# Patient Record
Sex: FEMALE | Race: WHITE | Employment: FULL TIME | URBAN - METROPOLITAN AREA
[De-identification: names, ages, dates, MRNs, and addresses within clinical notes are randomized per-mention and may not be internally consistent; named-entity substitution may affect disease eponyms.]

---

## 2021-03-30 NOTE — TELEPHONE ENCOUNTER
Results were discussed with patient and recommendations were made and she verbalized understanding.   Patient has an upcoming appt in June ans was advised to get labs done prior to her visit.       ----- Message from Pratik Cortes MD sent at 3/30/2021

## 2021-03-30 NOTE — PROGRESS NOTES
[facility]  New Patient History and Physical      HPI:   Patient presents with:  Physical  Heel Pain: R heel  Back Pain      Nelia Marc is a 40year old female presenting for:  Establishment of care.    Has  has no past medical hi CHOLESTEROL 67 > OR = 50 mg/dL    TRIGLYCERIDES 173 (H) <150 mg/dL    LDL-CHOLESTEROL 115 (H) mg/dL (calc)    CHOL/HDLC RATIO 3.1 <5.0 (calc)    NON-HDL CHOLESTEROL 144 (H) <130 mg/dL (calc)             Labs:   Complete Metabolic Panel:  Lab Results   Comp Constitutional: Negative for chills, fatigue, fever and unexpected weight change. HENT: Negative for congestion, ear pain, hearing loss, rhinorrhea, sinus pain and sore throat. Eyes: Negative for pain, redness and visual disturbance.    Respiratory: Pupils: Pupils are equal, round, and reactive to light. Neck:      Thyroid: No thyromegaly. Cardiovascular:      Rate and Rhythm: Normal rate and regular rhythm. Heart sounds: Normal heart sounds, S1 normal and S2 normal. No murmur heard.    No f 03/10/2022  Pap Smear,5 Years due on 03/01/2023  Pneumococcal Vaccine: Birth to [address] for this Visit:  Requested Prescriptions      No prescriptions requested or ordered in this encounter       Orders Placed This Encounter

## 2021-07-17 NOTE — PROGRESS NOTES
[address]  Return Patient      HPI:   No chief complaint on file. Jaydon Azevedo is a 40year old female presenting for:  Establishment of care. Has  has no past medical history on file.      Here for follow up on HLD and repe RATIO 3.1 <5.0 (calc)    NON-HDL CHOLESTEROL 144 (H) <130 mg/dL (calc)   HEMOGLOBIN A1C   Result Value Ref Range    HgbA1C 5.9 (H) <5.7 %    Estimated Average Glucose 123 68 - 126 mg/dL   LIPID PANEL   Result Value Ref Range    Cholesterol, Total 193 <200 Alcohol use: Yes    Drug use: Never       Family History:  Family History   Problem Relation Age of Onset   • No Known Problems Sister    • No Known Problems Brother           REVIEW OF SYSTEMS:   Review of Systems   Constitutional: Negative for chills, fa distress. Appearance: She is well-developed. HENT:      Head: Normocephalic and atraumatic. Eyes:      Conjunctiva/sclera: Conjunctivae normal.      Pupils: Pupils are equal, round, and reactive to light. Neck:      Thyroid: No thyromegaly.    Car Vaccine: Birth to [address] for this Visit:  Requested Prescriptions      No prescriptions requested or ordered in this encounter       No orders of the defined types were placed in this encounter.       Imaging & Consults:  Non

## 2021-09-15 NOTE — TELEPHONE ENCOUNTER
Results were discussed and she verbalized understanding.    ----- Message from Miguel Stanton MD sent at 9/13/2021 11:06 AM CDT -----  Please ensure she knows of mammogram results. No sign abnormality. One year screening repeat recommended.

## 2022-01-18 NOTE — PROGRESS NOTES
[facility]  Return Patient      HPI:   Patient presents with:  Abdominal Pain: left side      Braxton Cornelius is a 40year old female presenting for:  Establishment of care. Has  has no past medical history on file.      Here for fol 01/17/2022 04:39 PM    CREATSERUM 0.61 01/17/2022 04:39 PM    CA 8.8 01/17/2022 04:39 PM    GLU 91 01/17/2022 04:39 PM    TP 7.4 01/17/2022 04:39 PM    ALB 4.2 01/17/2022 04:39 PM    ALKPHO 96 01/17/2022 04:39 PM    AST 19 01/17/2022 04:39 PM    ALT 24 01/ Gastrointestinal: Positive for abdominal pain. Negative for abdominal distention, blood in stool, constipation and nausea. Endocrine: Negative for cold intolerance, heat intolerance and polyuria.    Genitourinary: Negative for dysuria, hematuria and urg Pulmonary effort is normal. No respiratory distress. Breath sounds: Normal breath sounds. No wheezing or rales. Chest:      Chest wall: No tenderness. Abdominal:      General: Bowel sounds are normal. There is no distension.       Palpations: Abdom HGB A1C (Glycohemoglobin) [496] [Q]      LIPASE [606] [Q]      Flulaval 6 months and older 0.5 ml PFS [07972]      Imaging & Consults:  FLULAVAL INFLUENZA VACCINE QUAD PRESERVATIVE FREE 0.5 ML  CT ABDOMEN+PELVIS([id number])        MD Mandeep Sánchez

## 2023-03-30 ENCOUNTER — APPOINTMENT (OUTPATIENT)
Dept: GENERAL RADIOLOGY | Age: 48
End: 2023-03-30
Attending: PHYSICIAN ASSISTANT
Payer: COMMERCIAL

## 2023-03-30 ENCOUNTER — HOSPITAL ENCOUNTER (OUTPATIENT)
Age: 48
Discharge: HOME | End: 2023-03-30
Payer: COMMERCIAL

## 2023-03-30 ENCOUNTER — TELEPHONE (OUTPATIENT)
Dept: INTERNAL MEDICINE CLINIC | Facility: CLINIC | Age: 48
End: 2023-03-30

## 2023-03-30 VITALS
OXYGEN SATURATION: 99 % | RESPIRATION RATE: 18 BRPM | HEART RATE: 84 BPM | DIASTOLIC BLOOD PRESSURE: 58 MMHG | TEMPERATURE: 98 F | SYSTOLIC BLOOD PRESSURE: 132 MMHG

## 2023-03-30 DIAGNOSIS — J06.9 VIRAL URI WITH COUGH: ICD-10-CM

## 2023-03-30 DIAGNOSIS — R06.2 WHEEZING: ICD-10-CM

## 2023-03-30 DIAGNOSIS — Z20.822 ENCOUNTER FOR LABORATORY TESTING FOR COVID-19 VIRUS: Primary | ICD-10-CM

## 2023-03-30 LAB
S PYO AG THROAT QL: NEGATIVE
SARS-COV-2 RNA RESP QL NAA+PROBE: NOT DETECTED

## 2023-03-30 PROCEDURE — 99203 OFFICE O/P NEW LOW 30 MIN: CPT | Performed by: PHYSICIAN ASSISTANT

## 2023-03-30 PROCEDURE — U0002 COVID-19 LAB TEST NON-CDC: HCPCS | Performed by: PHYSICIAN ASSISTANT

## 2023-03-30 PROCEDURE — 87880 STREP A ASSAY W/OPTIC: CPT | Performed by: PHYSICIAN ASSISTANT

## 2023-03-30 PROCEDURE — 71046 X-RAY EXAM CHEST 2 VIEWS: CPT | Performed by: PHYSICIAN ASSISTANT

## 2023-03-30 RX ORDER — BENZONATATE 100 MG/1
100 CAPSULE ORAL 3 TIMES DAILY PRN
Qty: 21 CAPSULE | Refills: 0 | Status: SHIPPED | OUTPATIENT
Start: 2023-03-30 | End: 2023-04-06

## 2023-03-30 RX ORDER — PREDNISONE 20 MG/1
40 TABLET ORAL DAILY
Qty: 10 TABLET | Refills: 0 | Status: SHIPPED | OUTPATIENT
Start: 2023-03-30 | End: 2023-04-04

## 2023-03-30 RX ORDER — ALBUTEROL SULFATE 90 UG/1
2 AEROSOL, METERED RESPIRATORY (INHALATION) EVERY 4 HOURS PRN
Qty: 1 EACH | Refills: 0 | Status: SHIPPED | OUTPATIENT
Start: 2023-03-30 | End: 2023-04-29

## 2023-03-30 NOTE — TELEPHONE ENCOUNTER
Patient is complaining of severe cough with yellow phlegm, sore throat, headaches, sob. Patient took a covid test last night and it was negative. Patient is requesting for an appt with MD. Please call and advise.        This started this Monday

## 2023-03-30 NOTE — ED INITIAL ASSESSMENT (HPI)
Pt in [address] [place] [address] for c/o sore throat, cough and hoarse voice x 4 days. No fever.

## 2023-12-11 ENCOUNTER — TELEPHONE (OUTPATIENT)
Dept: INTERNAL MEDICINE CLINIC | Facility: CLINIC | Age: 48
End: 2023-12-11

## 2023-12-11 ENCOUNTER — APPOINTMENT (OUTPATIENT)
Dept: GENERAL RADIOLOGY | Age: 48
End: 2023-12-11
Attending: NURSE PRACTITIONER
Payer: COMMERCIAL

## 2023-12-11 ENCOUNTER — HOSPITAL ENCOUNTER (OUTPATIENT)
Age: 48
Discharge: HOME | End: 2023-12-11
Payer: COMMERCIAL

## 2023-12-11 VITALS
OXYGEN SATURATION: 98 % | RESPIRATION RATE: 18 BRPM | DIASTOLIC BLOOD PRESSURE: 67 MMHG | SYSTOLIC BLOOD PRESSURE: 112 MMHG | HEART RATE: 81 BPM | TEMPERATURE: 98 F

## 2023-12-11 DIAGNOSIS — J11.1 INFLUENZA: Primary | ICD-10-CM

## 2023-12-11 LAB
POCT INFLUENZA A: POSITIVE
POCT INFLUENZA B: NEGATIVE
S PYO AG THROAT QL: NEGATIVE
SARS-COV-2 RNA RESP QL NAA+PROBE: NOT DETECTED

## 2023-12-11 PROCEDURE — 71046 X-RAY EXAM CHEST 2 VIEWS: CPT | Performed by: NURSE PRACTITIONER

## 2023-12-11 PROCEDURE — 99214 OFFICE O/P EST MOD 30 MIN: CPT | Performed by: NURSE PRACTITIONER

## 2023-12-11 PROCEDURE — U0002 COVID-19 LAB TEST NON-CDC: HCPCS | Performed by: NURSE PRACTITIONER

## 2023-12-11 PROCEDURE — 87880 STREP A ASSAY W/OPTIC: CPT | Performed by: NURSE PRACTITIONER

## 2023-12-11 PROCEDURE — 87502 INFLUENZA DNA AMP PROBE: CPT | Performed by: NURSE PRACTITIONER

## 2023-12-11 PROCEDURE — 94640 AIRWAY INHALATION TREATMENT: CPT | Performed by: NURSE PRACTITIONER

## 2023-12-11 RX ORDER — IPRATROPIUM BROMIDE AND ALBUTEROL SULFATE 2.5; .5 MG/3ML; MG/3ML
3 SOLUTION RESPIRATORY (INHALATION) ONCE
Status: COMPLETED | OUTPATIENT
Start: 2023-12-11 | End: 2023-12-11

## 2023-12-11 RX ORDER — ALBUTEROL SULFATE 90 UG/1
2 AEROSOL, METERED RESPIRATORY (INHALATION) EVERY 4 HOURS PRN
Qty: 1 EACH | Refills: 0 | Status: SHIPPED | OUTPATIENT
Start: 2023-12-11 | End: 2024-01-10

## 2023-12-11 RX ORDER — BENZONATATE 100 MG/1
200 CAPSULE ORAL 3 TIMES DAILY PRN
Qty: 30 CAPSULE | Refills: 0 | Status: SHIPPED | OUTPATIENT
Start: 2023-12-11 | End: 2024-01-10

## 2023-12-11 NOTE — TELEPHONE ENCOUNTER
Returned call to patient  w/ viral symptoms via  (# [phone]). Per patient she has experienced a scratchy sore throat and a cough x 3 days and asks for appt to see Dr Swapnil Howard in the office. Explained  (via ) that without a negative covid test result we cannot bring her into the office for exam and even if we could there are currently no open appt slots until next week. Advised she be seen in urgent/ Immediate Care clinic for evaluation- covid testing/ possibly strep testing.   [facility] for eval.

## 2023-12-11 NOTE — TELEPHONE ENCOUNTER
Patient has been with a cough for 3 days and has a sore throat. No covid test done. She wants to know if she can be seen.

## 2023-12-12 NOTE — DISCHARGE INSTRUCTIONS
Push fluids. Rest.  Tylenol or ibuprofen as needed for pain or fever. Take the medications as prescribed. Follow-up with your doctor. Return for any concerns.

## 2025-02-07 PROBLEM — N84.1 ENDOCERVICAL POLYP: Status: ACTIVE | Noted: 2018-02-22

## 2025-04-01 ENCOUNTER — OFFICE VISIT (OUTPATIENT)
Age: 50
End: 2025-04-01
Payer: COMMERCIAL

## 2025-04-01 VITALS
OXYGEN SATURATION: 97 % | HEIGHT: 62 IN | TEMPERATURE: 97 F | HEART RATE: 102 BPM | WEIGHT: 284 LBS | BODY MASS INDEX: 52.26 KG/M2

## 2025-04-01 DIAGNOSIS — Z12.11 ENCOUNTER FOR SCREENING COLONOSCOPY: ICD-10-CM

## 2025-04-01 DIAGNOSIS — E66.01 MORBID OBESITY (HCC): ICD-10-CM

## 2025-04-01 DIAGNOSIS — N92.6 MISSED PERIOD: ICD-10-CM

## 2025-04-01 DIAGNOSIS — E55.9 VITAMIN D DEFICIENCY: ICD-10-CM

## 2025-04-01 DIAGNOSIS — Z00.00 PREVENTATIVE HEALTH CARE: ICD-10-CM

## 2025-04-01 DIAGNOSIS — E61.1 IRON DEFICIENCY: ICD-10-CM

## 2025-04-01 DIAGNOSIS — Z12.4 PAP SMEAR FOR CERVICAL CANCER SCREENING: ICD-10-CM

## 2025-04-01 DIAGNOSIS — K62.5 BRIGHT RED BLOOD PER RECTUM: Primary | ICD-10-CM

## 2025-04-01 DIAGNOSIS — Z12.31 ENCOUNTER FOR SCREENING MAMMOGRAM FOR MALIGNANT NEOPLASM OF BREAST: ICD-10-CM

## 2025-04-01 PROCEDURE — 99215 OFFICE O/P EST HI 40 MIN: CPT | Performed by: INTERNAL MEDICINE

## 2025-04-01 NOTE — PROGRESS NOTES
[facility] Patient History and Physical      HPI:     Chief Complaint   Patient presents with    Physical       Francia JATINDER Miguel Curran is a 50 year old female presenting for:  [facility] care.  Has  has no past medical history on file.    Pt has not been seen since 2022.      50-year-old woman with a history of morbid obesity presents with multiple concerns. She reports weight gain of approximately 30 to 40 pounds over the past year. Her diet is high in carbohydrates. She also complains of fatigue and irregular menstruation, with her last menstrual period occurring about 6 months ago.    The patient reports bright red blood per rectum. No severe abdominal pain is reported.    She has a history of iron deficiency and vitamin D deficiency.    Medical History  - Morbid obesity  - Iron deficiency   - Vitamin D deficiency    Social History  - Diet: high in carbohydrates    Review of Systems  - Constitutional: Fatigue reported  - Gastrointestinal: No severe abdominal pain reported  - Genitourinary: Irregular menstruation, last menstrual period about 6 months ago  - Hematologic: Bright red blood per rectum          Labs:   Complete Metabolic Panel:  Lab Results   Component Value Date/Time     01/17/2022 04:39 PM    K 4.4 01/17/2022 04:39 PM     01/17/2022 04:39 PM    CO2 24 01/17/2022 04:39 PM    CREATSERUM 0.61 01/17/2022 04:39 PM    CA 8.8 01/17/2022 04:39 PM    GLU 91 01/17/2022 04:39 PM    TP 7.4 01/17/2022 04:39 PM    ALB 4.2 01/17/2022 04:39 PM    ALKPHO 96 01/17/2022 04:39 PM    AST 19 01/17/2022 04:39 PM    ALT 24 01/17/2022 04:39 PM    BILT 0.3 01/17/2022 04:39 PM        CBC:  Lab Results   Component Value Date    WBC 8.6 01/17/2022    HGB 11.9 01/17/2022    HCT 36.0 01/17/2022     01/17/2022            Hemoglobin A1C, Microalbumin  Lab Results   Component Value Date/Time    A1C 5.8 (H) 01/17/2022 04:39 PM        Lipid panel  Lab Results   Component  Value Date/Time    CHOLEST 193 07/15/2021 09:44 AM    HDL 66 (H) 07/15/2021 09:44 AM    TRIG 143 07/15/2021 09:44 AM     (H) 07/15/2021 09:44 AM    NONHDLC 127 07/15/2021 09:44 AM     The ASCVD Risk score (Felicia AGUILAR, et al., 2019) failed to calculate for the following reasons:    The systolic blood pressure is missing    Cannot find a previous HDL lab    Cannot find a previous total cholesterol lab       Medications:  No current outpatient medications on file.      PMH:  No past medical history on file.      PSH:  No past surgical history on file.    Allergies:  Allergies[1]   Social History:  Social History     Socioeconomic History    Marital status:    Tobacco Use    Smoking status: Never    Smokeless tobacco: Never   Substance and Sexual Activity    Alcohol use: Yes    Drug use: Never     Social Drivers of Health     Food Insecurity: No Food Insecurity (6/30/2022)    [facility]    Food Insecurity     Within the past 30 days, I worried whether my food would run out before I got money to buy more. / En los últimos 30 días, me preocupó que la comida se podía acabar antes de tener dinero para compr...: Never true / Nunca     Within the past 30 days, the food that I bought just didn't last, and I didn't have money to get more. / En los últimos 30 días, La comida que compré no rindió lo suficiente, y no tenía dinero para...: Never true / Nunca        Family History:  Family History   Problem Relation Age of Onset    No Known Problems Sister     No Known Problems Brother           REVIEW OF SYSTEMS:   Review of Systems   Constitutional:  Negative for chills, fatigue, fever and unexpected weight change.   HENT:  Negative for congestion, ear pain, hearing loss, rhinorrhea, sinus pain and sore throat.    Eyes:  Negative for pain, redness and visual disturbance.   Respiratory:  Negative for apnea, cough, chest tightness, shortness of breath  and wheezing.    Cardiovascular:  Negative for chest pain, palpitations and leg swelling.   Gastrointestinal:  Negative for abdominal distention, abdominal pain, blood in stool, constipation and nausea.   Endocrine: Negative for cold intolerance, heat intolerance and polyuria.   Genitourinary:  Negative for dysuria, hematuria and urgency.   Musculoskeletal:  Negative for arthralgias, back pain, gait problem, joint swelling, myalgias and neck pain.   Skin:  Negative for rash and wound.   Allergic/Immunologic: Negative for food allergies and immunocompromised state.   Neurological:  Negative for dizziness, seizures, facial asymmetry, speech difficulty, weakness, light-headedness, numbness and headaches.   Hematological:  Negative for adenopathy. Does not bruise/bleed easily.   Psychiatric/Behavioral:  Negative for behavioral problems, sleep disturbance and suicidal ideas. The patient is not nervous/anxious.             PHYSICAL EXAM:   Pulse 102   Temp 97 °F (36.1 °C)   Ht 5' 2\" (1.575 m)   Wt 284 lb (128.8 kg)   SpO2 97%   BMI 51.94 kg/m²  Estimated body mass index is 51.94 kg/m² as calculated from the following:    Height as of this encounter: 5' 2\" (1.575 m).    Weight as of this encounter: 284 lb (128.8 kg).     Wt Readings from Last 3 Encounters:   04/01/25 284 lb (128.8 kg)   04/05/22 269 lb (122 kg)   01/17/22 273 lb (123.8 kg)       Physical Exam  Vitals reviewed.   Constitutional:       General: She is not in acute distress.     Appearance: She is well-developed. She is obese.   HENT:      Head: Normocephalic and atraumatic.   Eyes:      Conjunctiva/sclera: Conjunctivae normal.      Pupils: Pupils are equal, round, and reactive to light.   Neck:      Thyroid: No thyromegaly.   Cardiovascular:      Rate and Rhythm: Normal rate and regular rhythm.      Heart sounds: Normal heart sounds, S1 normal and S2 normal. No murmur heard.     No friction rub. No gallop.   Pulmonary:      Effort: Pulmonary effort is  normal. No respiratory distress.      Breath sounds: Normal breath sounds. No wheezing or rales.   Chest:      Chest wall: No tenderness.   Abdominal:      General: Bowel sounds are normal. There is no distension.      Palpations: Abdomen is soft. There is no mass.      Tenderness: There is no abdominal tenderness. There is no guarding or rebound.   Musculoskeletal:         General: No tenderness. Normal range of motion.      Cervical back: Normal range of motion.   Lymphadenopathy:      Cervical: No cervical adenopathy.   Skin:     General: Skin is warm.      Findings: No erythema or rash.   Neurological:      Mental Status: She is alert and oriented to person, place, and time.      Cranial Nerves: No cranial nerve deficit.      Deep Tendon Reflexes: Reflexes are normal and symmetric.   Psychiatric:         Behavior: Behavior normal.         Thought Content: Thought content normal.         Judgment: Judgment normal.             ASSESSMENT AND PLAN:   Patient is a 50 year old female who presents primarily presents for:    Assessment and Plan:    1. Morbid Obesity with Recent Weight Gain:     - 50-year-old woman with history of morbid obesity     - Significant weight gain of approximately 30-40 pounds over the past year     - Diet high in carbohydrates     - May be contributing to fatigue and menstrual irregularities     - Provide weight loss counseling     - Refer to bariatric surgery for evaluation     - Recommend dietary modifications, focusing on reducing carbohydrate intake    2. Menstrual Irregularities:     - Last menstrual period approximately 6 months ago     - May be indicative of perimenopausal state     - Perform urine pregnancy test     - Consider further endocrine workup if pregnancy test is negative    3. Hematochezia:     - Reports bright red blood per rectum     - Refer to gastroenterology for evaluation and possible colonoscopy    4. Iron Deficiency:     - History of iron deficiency     - Order iron  studies    5. Vitamin D Deficiency:     - History of vitamin D deficiency     - Check vitamin D levels    6. Cervical Cancer Screening:     - Due for cervical cancer screening     - Schedule Pap smear    Medications:  (No medications explicitly mentioned in the provided text)      Health Maintenance:  Health Maintenance   Topic Date Due    Annual Physical  Never done    Colorectal Cancer Screening  Never done    DTaP,Tdap,and Td Vaccines (1 - Tdap) Never done    Mammogram  09/08/2022    Pap Smear  03/01/2023    COVID-19 Vaccine (3 - 2024-25 season) 09/01/2024    Annual Depression Screening  Never done    Influenza Vaccine (Season Ended) 10/01/2025    Zoster Vaccines (1 of 2) 03/07/2026    Pneumococcal Vaccine: Birth to 50yrs  Aged Out    Meningococcal B Vaccine  Aged Out             Meds & Refills for this Visit:  Requested Prescriptions      No prescriptions requested or ordered in this encounter       No orders of the defined types were placed in this encounter.      Imaging & Consults:  None        Micky Blackburn MD     No follow-ups on file.  Important issues to follow up on next visit      Patient indicates understanding of the above recommendations and agrees to the above plan.  Reasurrance and education provided. All questions answered.  Notified to call with any questions, complications, allergies, or worsening or changing symptoms as well as any side effects or complications from the treatments .  Red flags/ ER precautions discussed.    This note was produced using voice recognition software.  As a result, errors may occur.  When identified, these errors have been corrected.  While every attempt is made to correct errors during dictation, errors may still exist.    Total time spent was 68 minutes which includes: Preparation to see patient including chart review, reviewing appropriate medical history, counseling and education (diet and exercise), discussing treatment options, ordering appropriate  diagnostic tests and documentation.    As part of our commitment to providing you with comprehensive, transparent, and timely access to your health information, we adhere to the guidelines set forth by the 21st Century Cures Act. This Act enhances your rights to access your electronic health information and ensures that you can easily obtain your medical records.  Please note that the verbage used in this note is intended for medical documentation and communication and may be interpreted as forthright.  Please do not hesitate to contact my office if you have any questions.        Micky Blackburn MD  Internal Medicine/Primary Care  [facility]                   [1] No Known Allergies

## 2025-04-07 ENCOUNTER — LAB ENCOUNTER (OUTPATIENT)
Dept: LAB | Facility: HOSPITAL | Age: 50
End: 2025-04-07
Attending: INTERNAL MEDICINE
Payer: COMMERCIAL

## 2025-04-07 ENCOUNTER — HOSPITAL ENCOUNTER (OUTPATIENT)
Dept: MAMMOGRAPHY | Facility: HOSPITAL | Age: 50
Discharge: HOME | End: 2025-04-07
Attending: INTERNAL MEDICINE
Payer: COMMERCIAL

## 2025-04-07 DIAGNOSIS — Z12.31 ENCOUNTER FOR SCREENING MAMMOGRAM FOR MALIGNANT NEOPLASM OF BREAST: ICD-10-CM

## 2025-04-07 PROCEDURE — 77063 BREAST TOMOSYNTHESIS BI: CPT | Performed by: INTERNAL MEDICINE

## 2025-04-07 PROCEDURE — 77067 SCR MAMMO BI INCL CAD: CPT | Performed by: INTERNAL MEDICINE

## 2025-04-13 ENCOUNTER — LAB ENCOUNTER (OUTPATIENT)
Dept: LAB | Facility: HOSPITAL | Age: 50
End: 2025-04-13
Attending: INTERNAL MEDICINE
Payer: COMMERCIAL

## 2025-04-13 DIAGNOSIS — E55.9 VITAMIN D DEFICIENCY: ICD-10-CM

## 2025-04-13 DIAGNOSIS — N92.6 IRREGULAR MENSTRUAL CYCLE: Primary | ICD-10-CM

## 2025-04-13 LAB
ALBUMIN SERPL-MCNC: 4.3 G/DL (ref 3.2–4.8)
ALBUMIN/GLOB SERPL: 1.7 {RATIO} (ref 1–2)
ALP LIVER SERPL-CCNC: 105 U/L (ref 39–100)
ALT SERPL-CCNC: 50 U/L (ref 10–49)
ANION GAP SERPL CALC-SCNC: 8 MMOL/L (ref 0–18)
AST SERPL-CCNC: 33 U/L (ref ?–34)
B-HCG UR QL: NEGATIVE
BASOPHILS # BLD AUTO: 0.04 X10(3) UL (ref 0–0.2)
BASOPHILS NFR BLD AUTO: 0.6 %
BILIRUB SERPL-MCNC: 0.6 MG/DL (ref 0.3–1.2)
BUN BLD-MCNC: 11 MG/DL (ref 9–23)
BUN/CREAT SERPL: 17.7 (ref 10–20)
CALCIUM BLD-MCNC: 8.8 MG/DL (ref 8.7–10.4)
CHLORIDE SERPL-SCNC: 109 MMOL/L (ref 98–112)
CHOLEST SERPL-MCNC: 193 MG/DL (ref ?–200)
CO2 SERPL-SCNC: 25 MMOL/L (ref 21–32)
CREAT BLD-MCNC: 0.62 MG/DL (ref 0.55–1.02)
DEPRECATED HBV CORE AB SER IA-ACNC: 56 NG/ML (ref 50–306)
DEPRECATED RDW RBC AUTO: 46.9 FL (ref 35.1–46.3)
EGFRCR SERPLBLD CKD-EPI 2021: 109 ML/MIN/1.73M2 (ref 60–?)
EOSINOPHIL # BLD AUTO: 0.33 X10(3) UL (ref 0–0.7)
EOSINOPHIL NFR BLD AUTO: 5.1 %
ERYTHROCYTE [DISTWIDTH] IN BLOOD BY AUTOMATED COUNT: 15.1 % (ref 11–15)
EST. AVERAGE GLUCOSE BLD GHB EST-MCNC: 131 MG/DL (ref 68–126)
FASTING PATIENT LIPID ANSWER: YES
FASTING STATUS PATIENT QL REPORTED: YES
GLOBULIN PLAS-MCNC: 2.5 G/DL (ref 2–3.5)
GLUCOSE BLD-MCNC: 93 MG/DL (ref 70–99)
HBA1C MFR BLD: 6.2 % (ref ?–5.7)
HCT VFR BLD AUTO: 38.4 % (ref 35–48)
HDLC SERPL-MCNC: 54 MG/DL (ref 40–59)
HGB BLD-MCNC: 12.1 G/DL (ref 12–16)
IMM GRANULOCYTES # BLD AUTO: 0.02 X10(3) UL (ref 0–1)
IMM GRANULOCYTES NFR BLD: 0.3 %
IRON SATN MFR SERPL: 18 % (ref 15–50)
IRON SERPL-MCNC: 59 UG/DL (ref 50–170)
LDLC SERPL CALC-MCNC: 113 MG/DL (ref ?–100)
LYMPHOCYTES # BLD AUTO: 1.97 X10(3) UL (ref 1–4)
LYMPHOCYTES NFR BLD AUTO: 30.2 %
MCH RBC QN AUTO: 26.9 PG (ref 26–34)
MCHC RBC AUTO-ENTMCNC: 31.5 G/DL (ref 31–37)
MCV RBC AUTO: 85.5 FL (ref 80–100)
MONOCYTES # BLD AUTO: 0.27 X10(3) UL (ref 0.1–1)
MONOCYTES NFR BLD AUTO: 4.1 %
NEUTROPHILS # BLD AUTO: 3.89 X10 (3) UL (ref 1.5–7.7)
NEUTROPHILS # BLD AUTO: 3.89 X10(3) UL (ref 1.5–7.7)
NEUTROPHILS NFR BLD AUTO: 59.7 %
NONHDLC SERPL-MCNC: 139 MG/DL (ref ?–130)
OSMOLALITY SERPL CALC.SUM OF ELEC: 293 MOSM/KG (ref 275–295)
PLATELET # BLD AUTO: 275 10(3)UL (ref 150–450)
POTASSIUM SERPL-SCNC: 4.3 MMOL/L (ref 3.5–5.1)
PROT SERPL-MCNC: 6.8 G/DL (ref 5.7–8.2)
RBC # BLD AUTO: 4.49 X10(6)UL (ref 3.8–5.3)
SODIUM SERPL-SCNC: 142 MMOL/L (ref 136–145)
T4 FREE SERPL-MCNC: 1.1 NG/DL (ref 0.8–1.7)
TOTAL IRON BINDING CAPACITY: 325 UG/DL (ref 250–425)
TRANSFERRIN SERPL-MCNC: 258 MG/DL (ref 250–380)
TRIGL SERPL-MCNC: 145 MG/DL (ref 30–149)
TSI SER-ACNC: 5.95 UIU/ML (ref 0.55–4.78)
VIT D+METAB SERPL-MCNC: 21.5 NG/ML (ref 30–100)
VLDLC SERPL CALC-MCNC: 25 MG/DL (ref 0–30)
WBC # BLD AUTO: 6.5 X10(3) UL (ref 4–11)

## 2025-04-13 PROCEDURE — 82728 ASSAY OF FERRITIN: CPT | Performed by: INTERNAL MEDICINE

## 2025-04-13 PROCEDURE — 84439 ASSAY OF FREE THYROXINE: CPT | Performed by: INTERNAL MEDICINE

## 2025-04-13 PROCEDURE — 80053 COMPREHEN METABOLIC PANEL: CPT | Performed by: INTERNAL MEDICINE

## 2025-04-13 PROCEDURE — 84466 ASSAY OF TRANSFERRIN: CPT | Performed by: INTERNAL MEDICINE

## 2025-04-13 PROCEDURE — 81025 URINE PREGNANCY TEST: CPT

## 2025-04-13 PROCEDURE — 83540 ASSAY OF IRON: CPT | Performed by: INTERNAL MEDICINE

## 2025-04-13 PROCEDURE — 82306 VITAMIN D 25 HYDROXY: CPT

## 2025-04-13 PROCEDURE — 36415 COLL VENOUS BLD VENIPUNCTURE: CPT | Performed by: INTERNAL MEDICINE

## 2025-04-13 PROCEDURE — 83036 HEMOGLOBIN GLYCOSYLATED A1C: CPT | Performed by: INTERNAL MEDICINE

## 2025-04-13 PROCEDURE — 80061 LIPID PANEL: CPT | Performed by: INTERNAL MEDICINE

## 2025-04-13 PROCEDURE — 85025 COMPLETE CBC W/AUTO DIFF WBC: CPT | Performed by: INTERNAL MEDICINE

## 2025-04-13 PROCEDURE — 84443 ASSAY THYROID STIM HORMONE: CPT | Performed by: INTERNAL MEDICINE

## 2025-04-24 ENCOUNTER — OFFICE VISIT (OUTPATIENT)
Facility: CLINIC | Age: 50
End: 2025-04-24
Payer: COMMERCIAL

## 2025-04-24 ENCOUNTER — TELEPHONE (OUTPATIENT)
Facility: CLINIC | Age: 50
End: 2025-04-24

## 2025-04-24 VITALS
WEIGHT: 282 LBS | HEART RATE: 101 BPM | BODY MASS INDEX: 51.89 KG/M2 | DIASTOLIC BLOOD PRESSURE: 72 MMHG | HEIGHT: 62 IN | SYSTOLIC BLOOD PRESSURE: 107 MMHG

## 2025-04-24 DIAGNOSIS — K62.5 BRBPR (BRIGHT RED BLOOD PER RECTUM): ICD-10-CM

## 2025-04-24 DIAGNOSIS — Z12.11 COLON CANCER SCREENING: Primary | ICD-10-CM

## 2025-04-24 PROCEDURE — 99204 OFFICE O/P NEW MOD 45 MIN: CPT

## 2025-04-24 NOTE — TELEPHONE ENCOUNTER
Scheduled for:  Colonoscopy [id number]   Location:  [facility] ([facility])  31+15=46  Provider: Zane Lang MD    Date:  7/14/2025 Monday  Time:   9:50 am  (Patient made aware will receive phone call the day before with an arrival time)    Sedation:  MAC  Prep:  Split GoLytely    Diagnosis with codes:    ICD-10-CM   1. Colon cancer screening  Z12.11   2. BRBPR (bright red blood per rectum)  K62.5        Meds/Allergies Reconciled?:   Provider Reviewed   Was patient informed to call insurance with codes (Y/N):  Yes  Referral sent?: Referral was sent at the time of electronic surgical scheduling.  [facility] or [facility] notified?: I sent an electronic request to ENDO Scheduling and received a confirmation today.     Medication Orders:  Patient is aware to NOT take iron pills, herbal meds and diet supplements for 7 days before exam. Also to NOT take any form of alcohol, recreational drugs and any forms of ED meds 24 hours before exam.     Misc Orders:  N/A   Further instructions given by staff:  I Provided prep instructions to patient and reviewed date, time and location. Patient verbalized that  She / Her understood and is aware to call with any questions.  Patient was informed about the new cancellation policy for She / Her procedure. Patient was also given copy of the cancellation policy at the time of the appointment and verbalized understanding.

## 2025-04-24 NOTE — H&P
[facility] - Gastroenterology                                                                                                  Clinic History and Physical     Chief Complaint   Patient presents with    Blood In Stool       Requesting physician or provider: Micky Blackburn MD    HPI:   Francia Curran is a 50 year old year-old female with active diagnoses including hypothyroidism, obesity, pre-diabetes. Prior medical/surgical hx in note table. The patient presents for colon cancer screening evaluation.    #BRBPR  -she reports episode lasting 3 weeks of blood on tissue when wiping after bowel movement. Last occurred 10 days ago. Did not try any treatments at home. Denies any rectal or anal pain.   -has bowel movements once daily, stool is soft  -ferritin, iron & hemoglobin WNL on 4/13 labs    #CRC screening  -no prior colonoscopy    Patient is here today as a referral from her PCP for evaluation prior to undergoing colonoscopy for CRC screening. Patient denies any GI symptoms of nausea, vomiting, heartburn, dyspepsia, dysphagia, hematemesis, abdominal pain, change in bowel habits, constipation, diarrhea, hematochezia, or melena.  Additionally there is no unintentional weight loss.    Pt is due for CRC screening. We discussed the available screening options for CRC such as FIT and cologuard. They are electing to pursue colonoscopy at this time.     Last colonoscopy: none   Last EGD:  none     NSAIDS: none   Tobacco: none   Alcohol:  Marijuana: none   Illicit drugs: none     FH GI malignancy:     No history of adverse reaction to sedation  No CANDIDO  No anticoagulants/antiplatelet  No pacemaker/defibrillator  No pain medications and/or sleep aides    Wt Readings from Last 6 Encounters:   04/24/25 282 lb (127.9 kg)   04/01/25 284 lb (128.8 kg)   04/05/22 269 lb (122 kg)   01/17/22 273 lb (123.8 kg)   07/15/21 260 lb (117.9 kg)   03/10/21 258 lb (117 kg)          History, Medications, Allergies, ROS:       Past Medical History[1]   Past Surgical History[2]   Family Hx: Family History[3]   Social History: Short Social Hx on File[4]     Medications (Active prior to today's visit):  Current Medications[5]    Allergies:  Allergies[6]    ROS:   CONSTITUTIONAL: negative for fevers, chills, sweats  EYES Negative for scleral icterus or redness, and diplopia  HEENT: Negative for hoarseness  RESPIRATORY: Negative for cough and severe shortness of breath  CARDIOVASCULAR: Negative for crushing sub-sternal chest pain  GASTROINTESTINAL: See HPI  GENITOURINARY: Negative for dysuria  MUSCULOSKELETAL: Negative for arthralgias and myalgias  SKIN: Negative for jaundice, rash or pruritus  NEUROLOGICAL: Negative for dizziness and headaches  BEHAVIOR/PSYCH: Negative for psychotic behavior      PHYSICAL EXAM:   Blood pressure 107/72, pulse 101, height 5' 2\" (1.575 m), weight 282 lb (127.9 kg), last menstrual period 10/01/2024.    GEN: Alert, no acute distress, well-nourished   HEENT: anicteric sclera, neck supple, trachea midline, MMM, no palpable or tender neck or supraclavicular lymph nodes  CV: RRR, the extremities are warm and well perfused   LUNGS: No increased work of breathing, CTAB  ABDOMEN: Soft, symmetrical, non-tender without distention or guarding. No scars or lesions. Aorta is without bruit or visible pulsation. Umbilicus is midline without herniation. Normoactive bowel sounds are present, No masses, hepatomegaly or splenomegaly noted. Exam limited by body habitus  MSK: No erythema, no warmth, no swelling of joints  SKIN: No jaundice, no erythema, no rashes, no lesions  HEMATOLOGIC: No bleeding, no bruising  NEURO: Alert and interactive, GRIFFIN  PSYCH: appropriate mood & affect    Labs/Imaging:     Patient's labs and imaging were reviewed and discussed with patient today.    .  ASSESSMENT/PLAN:   Francia Curran is a 50 year old year-old female with active diagnoses including hypothyroidism, obesity,  pre-diabetes. Prior medical/surgical hx in note table. The patient presents for colon cancer screening evaluation.    #BRBPR  -she reports episode lasting 3 weeks of blood on tissue when wiping after bowel movement. Last occurred 10 days ago. Did not try any treatments at home. Denies any rectal or anal pain.   -has bowel movements once daily, stool is soft  -ferritin, iron & hemoglobin WNL on 4/13 labs  -Symptom was not exacerbated by any stool changes.  There is no new pain.  No anemia.  Advised patient to monitor and follow-up ASAP if symptom returns.  Otherwise we will plan for screening colonoscopy    #CRC screening  -no prior colonoscopy  -no anemia noted on blood work.    Recommend:  -follow up sooner if bleeding returns     -------------------------------------------------------------------------------------------------  -Schedule colonoscopy with [facility]oscopist  Diagnosis: BRBPR, CRC screening   Sedation: MAC  Prep: split dose golytely    Medication changes:   Stop taking vitamins and/or supplement 2 weeks prior to endoscopic procedure    -Anti-platelets and anti-coagulants: N/A   -Diabetes meds: N/A     Endoscopy risk/benefit discussion: I have thoroughly discussed the risks, benefits, and alternatives of endoscopic evaluation with the patient, who demonstrated understanding. This includes the potential risks of bleeding, infection, pain, anesthesia complications, and perforation, which may result in prolonged hospitalization or surgical intervention. All of the patient’s questions were addressed to their satisfaction. The patient has chosen to proceed with the endoscopic procedure, including any necessary interventions such as polypectomy, biopsy, control of bleeding.     # [id number] used for this patient exam and instructions.    Orders This Visit:  No orders of the defined types were placed in this encounter.      Meds This Visit:  Requested Prescriptions     Signed  Prescriptions Disp Refills    polyethylene glycol, PEG 3350-KCl-NaBcb-NaCl-NaSulf, 236 g Oral Recon Soln 1 each 0     Sig: Take 4,000 mL by mouth As Directed. Take 2,000 mL the night before your procedure and 2,000 mL the morning of your procedure. Take as directed by GI clinic. Okay to substitute for generic.       Imaging & Referrals:  None       PRASHANTH Newberry    [facility]  4/24/2025      The dictation was partially prepared using Dragon Medical voice recognition software. As a result, errors may occur. When identified, these errors have been corrected. While every attempt is made to correct errors during dictation, discrepancies may still exist.              [1] History reviewed. No pertinent past medical history.  [2] History reviewed. No pertinent surgical history.  [3]   Family History  Problem Relation Age of Onset    No Known Problems Sister     No Known Problems Brother     Breast Cancer Neg     Ovarian Cancer Neg     Prostate Cancer Neg     Pancreatic Cancer Neg     Colon Cancer Neg    [4]   Social History  Socioeconomic History    Marital status:    Tobacco Use    Smoking status: Never    Smokeless tobacco: Never   Substance and Sexual Activity    Alcohol use: Yes    Drug use: Never     Social Drivers of Health     Food Insecurity: No Food Insecurity (6/30/2022)    Received from [facility]    Food Insecurity     Within the past 30 days, I worried whether my food would run out before I got money to buy more. / En los últimos 30 días, me preocupó que la comida se podía acabar antes de tener dinero para compr...: Never true / Nunca     Within the past 30 days, the food that I bought just didn't last, and I didn't have money to get more. / En los últimos 30 días, La comida que compré no rindió lo suficiente, y no tenía dinero para...: Never true / Nunca   [5]   Current Outpatient Medications   Medication Sig Dispense Refill    polyethylene glycol, PEG  3350-KCl-NaBcb-NaCl-NaSulf, 236 g Oral Recon Soln Take 4,000 mL by mouth As Directed. Take 2,000 mL the night before your procedure and 2,000 mL the morning of your procedure. Take as directed by GI clinic. Okay to substitute for generic. 1 each 0   [6] No Known Allergies

## 2025-04-24 NOTE — PATIENT INSTRUCTIONS
-follow up sooner if bleeding returns     -------------------------------------------------------------------------------------------------    1. Schedule colonoscopy with [facility] Endoscopist  Diagnosis: BRBPR, CRC screening   Sedation: MAC  Prep: split dose golytely    Medication changes:   Stop taking vitamins and/or supplement 2 weeks prior to endoscopic procedure    2.  bowel prep from pharmacy   You can pick the bowel prep up now and store in a cool, dry place in your home until your scheduled bowel prep start date.    3. DO NOT TAKE: Any form of alcohol, recreational drugs 24 hours prior to procedure.    4. Read all bowel prep instructions and medication adjustments carefully. Bowel prep instructions can also be found online at:  [email or web address]     5. AVOID seeds, nuts, popcorn, raw fruits and vegetables for 5 days before procedure    6. If you start any NEW medication after your visit today, please notify us. Certain medications will need to be held before the procedure, or the procedure cannot be performed safely.

## 2025-05-12 ENCOUNTER — OFFICE VISIT (OUTPATIENT)
Dept: OBGYN CLINIC | Facility: CLINIC | Age: 50
End: 2025-05-12
Payer: COMMERCIAL

## 2025-05-12 VITALS
BODY MASS INDEX: 51.67 KG/M2 | SYSTOLIC BLOOD PRESSURE: 118 MMHG | HEIGHT: 62 IN | DIASTOLIC BLOOD PRESSURE: 78 MMHG | WEIGHT: 280.81 LBS

## 2025-05-12 DIAGNOSIS — E66.01 MORBID OBESITY (HCC): ICD-10-CM

## 2025-05-12 DIAGNOSIS — Z01.419 ENCOUNTER FOR ANNUAL ROUTINE GYNECOLOGICAL EXAMINATION: Primary | ICD-10-CM

## 2025-05-12 DIAGNOSIS — N95.1 MENOPAUSAL SYNDROME (HOT FLASHES): ICD-10-CM

## 2025-05-12 PROCEDURE — 88175 CYTOPATH C/V AUTO FLUID REDO: CPT | Performed by: OBSTETRICS & GYNECOLOGY

## 2025-05-12 PROCEDURE — 87624 HPV HI-RISK TYP POOLED RSLT: CPT | Performed by: OBSTETRICS & GYNECOLOGY

## 2025-05-12 RX ORDER — PAROXETINE 7.5 MG/1
1 CAPSULE ORAL DAILY
Qty: 90 CAPSULE | Refills: 3 | Status: SHIPPED | OUTPATIENT
Start: 2025-05-12 | End: 2025-06-11

## 2025-05-12 NOTE — PROGRESS NOTES
ANNUAL GYN EXAM  [facility]    CHIEF COMPLAINT:    Chief Complaint   Patient presents with    Annual     pap      HISTORY OF PRESENT ILLNESS:   Francia Curran is a 50 year old female   who presents for annual well woman visit.  She is feeling well without complaints.      No menses since 10/2024. Has had hot flashes for last 2 years with 10/2024. Has night sweats as well.  Has had 30 lb weight gain in last year. Had labs with PCP and has follow up next month    PAST GYNECOLOGICAL HISTORY & OTHER PREVENTIVE MEDICINE  LMP: Patient's last menstrual period was 10/01/2024 (approximate).  Menarche: 13 years (2025 12:59 PM)  Period Cycle (Days): no cycle since 10/2024 (2025 12:59 PM)  Use of Birth Control (if yes, specify type): None (2025 12:59 PM)  Hx Prior Abnormal Pap: No (2025 12:59 PM)  Pap Date: 18 (2025 12:59 PM)  Pap Result Notes: neg (2025 12:59 PM)  Follow Up Recommendation: last mammo done 2025 wnl (2025 12:59 PM)    Complications: denies postmenopausal bleeding.   Gravita/Parity:   Contraception: current -none; Previous - oral contraceptive pill  Sexually transmitted disease history:None  Number of sexual partners: current sexual partners: 1, 30 yrs, Lifetime partners:   Pap history: 3 years per patient. Last pap/result:  ; history abnormals: denies  Date of last mammogram:  2025; history abnormals denies  Last Colonoscopy: has appointment 2025; history abnormals   Abuse history: denies  Vaginal discharge: denies  Bladder symptoms: denies    PAST MEDICAL HISTORY:   Past Medical History[1]     PAST SURGICAL HISTORY:   Past Surgical History[2]     PAST OB HISTORY:  OB History    Para Term  AB Living   3 3 3   3   SAB IAB Ectopic Multiple Live Births       3      # Outcome Date GA Lbr Misbah/2nd Weight Sex Type Anes PTL Lv   3 Term      Caesarean      2 Term      NORMAL SPONT      1 Term      NORMAL SPONT           CURRENT MEDICATIONS:    Medications - Current[3]    ALLERGIES:  Allergies[4]    SOCIAL HISTORY:  Social Hx on file[5]    FAMILY HISTORY:  Family History[6]  ASSESSMENTS:  REVIEW OF SYSTEMS:  CONSTITUTIONAL:  negative for fevers, chills and sweats    EYES:  negative for  blurred vision and visual disturbance  RESPIRATORY:  negative for  cough and shortness of breath  CARDIOVASCULAR:  negative for  chest pain, palpitations  GASTROINTESTINAL:  No constipation/diarrhea, no pain  GENITOURINARY:  See History of Present Illness  INTEGUMENT/BREAST: Breast: no masses, no nipple discharge  ENDOCRINE:  negative for acne, constipation, diarrhea, cold intolerance, heat intolerance, fatigue, hair loss, weight gain and weight loss  MUSCULOSKELETAL:  negative for joint pain  NEUROLOGICAL:  negative for dizziness/lightheadedness and headaches  BEHAVIOR/PSYCH:  Negative for depressed mood, anhedonia and anxiety    PHYSICAL EXAM  Patient's last menstrual period was 10/01/2024 (approximate).   Vitals:    05/12/25 1300   BP: 118/78   Weight: 280 lb 12.8 oz (127.4 kg)   Height: 62\"       CONSTITUTIONAL: Awake, alert, cooperative, no apparent distress, and appears stated age   NECK:  symmetrical, trachea midline, no adenopathy, thyroid symmetric, not enlarged   LUNGS: respiration unlabored  CARDIOVASCULAR: no peripheral edema or varicosities, skin warm and dry  ABDOMEN: Soft, non-distended, non-tender, no masses palpated    GENITAL/URINARY:    External Genitalia:  General appearance; normal, Hair distribution; normal, Lesions absent   Urethral Meatus:  Lesions absent, Prolapse absent  Bladder:  Tenderness absent, Cystocele absent  Vagina:  Discharge absent, Lesions absent, Pelvic support normal  Cervix:  Lesions absent, Discharge absent, Tenderness absent  Uterus:  Size normal, Masses absent, Tenderness absent  Adnexa:  Masses absent, Tenderness absent  Anus/Perineum:  Lesions absent    MUSCULOSKELETAL: There is no redness, warmth,  or swelling of the joints.  Full range of motion noted.  Motor strength is 5 out of 5 all extremities bilaterally.  Tone is normal.  NEUROLOGIC: Patient is awake, alert and oriented to name, place and time.  Casual gait is normal.  SKIN: no bruising or bleeding and no rashes  PSYCHIATRIC: Behavior:  Appropriate  Mood:  appropriate  ASSESSMENT AND PLAN:  1. Encounter for annual routine gynecological examination  Follow up with PCP regarding prediabetes, patient not aware.  Also, not aware that Prescription for levothyoxine was sent to pharmacy. Advised to follow up with PCP    - ThinPrep PAP Smear; Future  - Hpv Dna  High Risk , Thin Prep Collect; Future    2. Menopausal syndrome (hot flashes)  Consider HRT if not improved with Brisdelle  - PARoxetine Mesylate (BRISDELLE) 7.5 MG Oral Cap; Take 1 tablet by mouth daily.  Dispense: 90 capsule; Refill: 3    3. Morbid obesity (HCC)    - [employer] [facility] - Dr. Alcides De Leon [facility] 9       Preventive Medicine in a 50 year old female  Health Maintenance Topics with due status: Overdue       Topic Date Due    Annual Physical Never done    Colorectal Cancer Screening Never done    Pap Smear 2023    COVID-19 Vaccine 2024    Annual Depression Screening Never done       COUNSELING/EDUCATION PERFORMED:   Cervical Cancer Screening  Breast Cancer Screening - monthly self breast exam  Osteoporosis Screening  Appropriate diet  Exercise  Safe sex/STD transmission/use of condoms  follow up 1 yr or as needed  Jessika Oneal MD         [1] History reviewed. No pertinent past medical history.  [2]   Past Surgical History:  Procedure Laterality Date         [3]   Current Outpatient Medications:     PARoxetine Mesylate (BRISDELLE) 7.5 MG Oral Cap, Take 1 tablet by mouth daily., Disp: 90 capsule, Rfl: 3    levothyroxine 25 MCG Oral Tab, Take 1 tablet (25 mcg total) by mouth before breakfast. (Patient not taking: Reported on 2025),  Disp: 30 tablet, Rfl: 2    polyethylene glycol, PEG 3350-KCl-NaBcb-NaCl-NaSulf, 236 g Oral Recon Soln, Take 4,000 mL by mouth As Directed. Take 2,000 mL the night before your procedure and 2,000 mL the morning of your procedure. Take as directed by GI clinic. Okay to substitute for generic. (Patient not taking: Reported on 5/12/2025), Disp: 1 each, Rfl: 0  [4] No Known Allergies  [5]   Social History  Socioeconomic History    Marital status:    Tobacco Use    Smoking status: Never    Smokeless tobacco: Never   Substance and Sexual Activity    Alcohol use: Not Currently    Drug use: Never    Sexual activity: Yes   Other Topics Concern    Blood Transfusions No   [6]   Family History  Problem Relation Age of Onset    No Known Problems Sister     No Known Problems Brother     Breast Cancer Neg     Ovarian Cancer Neg     Prostate Cancer Neg     Pancreatic Cancer Neg     Colon Cancer Neg

## 2025-05-13 LAB — HPV E6+E7 MRNA CVX QL NAA+PROBE: NEGATIVE

## 2025-06-02 ENCOUNTER — OFFICE VISIT (OUTPATIENT)
Age: 50
End: 2025-06-02
Payer: COMMERCIAL

## 2025-06-02 ENCOUNTER — HOSPITAL ENCOUNTER (OUTPATIENT)
Dept: GENERAL RADIOLOGY | Facility: HOSPITAL | Age: 50
Discharge: HOME | End: 2025-06-02
Attending: INTERNAL MEDICINE
Payer: COMMERCIAL

## 2025-06-02 VITALS
TEMPERATURE: 97 F | HEIGHT: 62 IN | DIASTOLIC BLOOD PRESSURE: 80 MMHG | WEIGHT: 283 LBS | HEART RATE: 86 BPM | BODY MASS INDEX: 52.08 KG/M2 | SYSTOLIC BLOOD PRESSURE: 134 MMHG | OXYGEN SATURATION: 98 %

## 2025-06-02 DIAGNOSIS — M25.562 CHRONIC PAIN OF LEFT KNEE: Primary | ICD-10-CM

## 2025-06-02 DIAGNOSIS — M25.562 CHRONIC PAIN OF LEFT KNEE: ICD-10-CM

## 2025-06-02 DIAGNOSIS — G89.29 CHRONIC PAIN OF LEFT KNEE: ICD-10-CM

## 2025-06-02 DIAGNOSIS — G89.29 CHRONIC PAIN OF LEFT KNEE: Primary | ICD-10-CM

## 2025-06-02 PROCEDURE — 73562 X-RAY EXAM OF KNEE 3: CPT | Performed by: INTERNAL MEDICINE

## 2025-06-02 PROCEDURE — 99213 OFFICE O/P EST LOW 20 MIN: CPT | Performed by: INTERNAL MEDICINE

## 2025-06-02 RX ORDER — NAPROXEN 500 MG/1
500 TABLET ORAL 2 TIMES DAILY PRN
Qty: 30 TABLET | Refills: 0 | Status: SHIPPED | OUTPATIENT
Start: 2025-06-02

## 2025-06-02 NOTE — PROGRESS NOTES
[facility] part of [facility]  Return Patient Progress Note      HPI:     Chief Complaint   Patient presents with    Knee Pain     Left knee pain   Currently taking ibuprofen 700mg DQ       Francia Curran is a 50 year old female presenting for:    Has a significant  has a past medical history of Hormone disorder (8-2024).    Here for left knee pain.        Labs:   CMP:  Lab Results   Component Value Date/Time     04/13/2025 07:48 AM    K 4.3 04/13/2025 07:48 AM     04/13/2025 07:48 AM    CO2 25.0 04/13/2025 07:48 AM    CREATSERUM 0.62 04/13/2025 07:48 AM    CA 8.8 04/13/2025 07:48 AM    GLU 93 04/13/2025 07:48 AM    TP 6.8 04/13/2025 07:48 AM    ALB 4.3 04/13/2025 07:48 AM    ALKPHO 105 (H) 04/13/2025 07:48 AM    AST 33 04/13/2025 07:48 AM    ALT 50 (H) 04/13/2025 07:48 AM    BILT 0.6 04/13/2025 07:48 AM    TSH 5.948 (H) 04/13/2025 07:48 AM    T4F 1.1 04/13/2025 07:48 AM        CBC:  Lab Results   Component Value Date    WBC 6.5 04/13/2025    HGB 12.1 04/13/2025    HCT 38.4 04/13/2025    .0 04/13/2025    NEPERCENT 59.7 04/13/2025    LYPERCENT 30.2 04/13/2025    MOPERCENT 4.1 04/13/2025    EOPERCENT 5.1 04/13/2025    BAPERCENT 0.6 04/13/2025    NE 3.89 04/13/2025    LYMABS 1.97 04/13/2025    MOABSO 0.27 04/13/2025    EOABSO 0.33 04/13/2025    BAABSO 0.04 04/13/2025          Hemoglobin A1C, Microalbumin  Lab Results   Component Value Date/Time    A1C 6.2 (H) 04/13/2025 07:48 AM        Lipid panel  Lab Results   Component Value Date/Time    CHOLEST 193 04/13/2025 07:48 AM    HDL 54 04/13/2025 07:48 AM    TRIG 145 04/13/2025 07:48 AM     (H) 04/13/2025 07:48 AM    NONHDLC 139 (H) 04/13/2025 07:48 AM        Medications:  Current Medications[1]   PMH:  Past Medical History[2]            REVIEW OF SYSTEMS:   Review of Systems   Constitutional:  Negative for chills, fatigue, fever and unexpected weight change.   HENT:  Negative for congestion, ear pain, hearing  loss, rhinorrhea, sinus pain and sore throat.    Eyes:  Negative for pain, redness and visual disturbance.   Respiratory:  Negative for apnea, cough, chest tightness, shortness of breath and wheezing.    Cardiovascular:  Negative for chest pain, palpitations and leg swelling.   Gastrointestinal:  Negative for abdominal distention, abdominal pain, blood in stool, constipation and nausea.   Endocrine: Negative for cold intolerance, heat intolerance and polyuria.   Genitourinary:  Negative for dysuria, hematuria and urgency.   Musculoskeletal:  Negative for arthralgias, back pain, gait problem, joint swelling, myalgias and neck pain.        Left knee pain    Skin:  Negative for rash and wound.   Allergic/Immunologic: Negative for food allergies and immunocompromised state.   Neurological:  Negative for dizziness, seizures, facial asymmetry, speech difficulty, weakness, light-headedness, numbness and headaches.   Hematological:  Negative for adenopathy. Does not bruise/bleed easily.   Psychiatric/Behavioral:  Negative for behavioral problems, sleep disturbance and suicidal ideas. The patient is not nervous/anxious.             PHYSICAL EXAM:   /80   Pulse 86   Temp 97.2 °F (36.2 °C)   Ht 5' 2\" (1.575 m)   Wt 283 lb (128.4 kg)   LMP 10/01/2024 (Approximate)   SpO2 98%   BMI 51.76 kg/m²  Estimated body mass index is 51.76 kg/m² as calculated from the following:    Height as of this encounter: 5' 2\" (1.575 m).    Weight as of this encounter: 283 lb (128.4 kg).     Wt Readings from Last 3 Encounters:   06/02/25 283 lb (128.4 kg)   05/12/25 280 lb 12.8 oz (127.4 kg)   04/24/25 282 lb (127.9 kg)       Physical Exam  Vitals reviewed.   Constitutional:       General: She is not in acute distress.     Appearance: She is well-developed.   HENT:      Head: Normocephalic and atraumatic.   Eyes:      Conjunctiva/sclera: Conjunctivae normal.      Pupils: Pupils are equal, round, and reactive to light.   Neck:       Thyroid: No thyromegaly.   Cardiovascular:      Rate and Rhythm: Normal rate and regular rhythm.      Heart sounds: Normal heart sounds, S1 normal and S2 normal. No murmur heard.     No friction rub. No gallop.   Pulmonary:      Effort: Pulmonary effort is normal. No respiratory distress.      Breath sounds: Normal breath sounds. No wheezing or rales.   Chest:      Chest wall: No tenderness.   Abdominal:      General: Bowel sounds are normal. There is no distension.      Palpations: Abdomen is soft. There is no mass.      Tenderness: There is no abdominal tenderness. There is no guarding or rebound.   Musculoskeletal:         General: No tenderness. Normal range of motion.      Cervical back: Normal range of motion.   Lymphadenopathy:      Cervical: No cervical adenopathy.   Skin:     General: Skin is warm.      Findings: No erythema or rash.   Neurological:      Mental Status: She is alert and oriented to person, place, and time.      Cranial Nerves: No cranial nerve deficit.      Deep Tendon Reflexes: Reflexes are normal and symmetric.   Psychiatric:         Behavior: Behavior normal.         Thought Content: Thought content normal.         Judgment: Judgment normal.               ASSESSMENT AND PLAN:   Patient is a 50 year old female who presents primarily presents for:    (M25.562,  G89.29) Chronic pain of left knee  (primary encounter diagnosis)  Plan: Left knee xray    NSAIDs PRN.                     Health Maintenance:    Health Maintenance   Topic Date Due    Annual Physical  Never done    Colorectal Cancer Screening  Never done    COVID-19 Vaccine (5 - 2024-25 season) 09/01/2024    Zoster Vaccines (1 of 2) 03/07/2026    Mammogram  04/07/2026    Pap Smear  05/12/2030    DTaP,Tdap,and Td Vaccines (2 - Td or Tdap) 02/19/2035    Influenza Vaccine  Completed    Annual Depression Screening  Completed    Pneumococcal Vaccine: Birth to 50yrs  Aged Out    Meningococcal B Vaccine  Aged Out         Meds & Refills for  this Visit:  Requested Prescriptions      No prescriptions requested or ordered in this encounter       No orders of the defined types were placed in this encounter.      Imaging & Consults:  None          No follow-ups on file.  Important follow up notes/labs for next visit      Patient indicates understanding of the above recommendations and agrees to the above plan.  Reasurrance and education provided. All questions answered.    Notified to call with any questions, complications, allergies, or worsening or changing symptoms as well as any side effects or complications from the treatments .  Red flags/ ER precautions discussed.    If diagnostic labs or imaging ordered advised patient to contact my office for results  24-48 hours after completion    This note was dictated using dragon speech recognition transcription software.  Typographical and transcription errors may be present.  Please call if any questions.       As part of our commitment to providing you with comprehensive, transparent, and timely access to your health information, we adhere to the guidelines set forth by the 21st Century Cures Act. This Act enhances your rights to access your electronic health information and ensures that you can easily obtain your medical records.  Please note that the verbage used in this note is intended for medical documentation and communication and may be interpreted as forthright.  Please do not hesitate to contact my office if you have any questions.            Micky Blackburn MD  [facility]                         [1]   Current Outpatient Medications   Medication Sig Dispense Refill    PARoxetine Mesylate (BRISDELLE) 7.5 MG Oral Cap Take 1 tablet by mouth daily. 90 capsule 3    levothyroxine 25 MCG Oral Tab Take 1 tablet (25 mcg total) by mouth before breakfast. 30 tablet 2    polyethylene glycol, PEG 3350-KCl-NaBcb-NaCl-NaSulf, 236 g Oral Recon Soln Take 4,000 mL by mouth As Directed. Take 2,000 mL the night before  your procedure and 2,000 mL the morning of your procedure. Take as directed by GI clinic. Okay to substitute for generic. (Patient not taking: Reported on 5/12/2025) 1 each 0   [2]   Past Medical History:   Hormone disorder

## 2025-06-10 ENCOUNTER — PATIENT MESSAGE (OUTPATIENT)
Age: 50
End: 2025-06-10

## 2025-07-03 ENCOUNTER — TELEPHONE (OUTPATIENT)
Facility: CLINIC | Age: 50
End: 2025-07-03

## 2025-07-03 NOTE — TELEPHONE ENCOUNTER
Patient was called to confirm upcoming procedure on 7/14  Confirmed location, date, medications, prep  and insurance.    Patient is aware to hold:  None     Patient had prep questions, which were addressed appropriately.    Patient verbally understood.

## 2025-07-14 ENCOUNTER — HOSPITAL ENCOUNTER (OUTPATIENT)
Facility: HOSPITAL | Age: 50
Setting detail: HOSPITAL OUTPATIENT SURGERY
Discharge: HOME | End: 2025-07-14
Attending: STUDENT IN AN ORGANIZED HEALTH CARE EDUCATION/TRAINING PROGRAM | Admitting: STUDENT IN AN ORGANIZED HEALTH CARE EDUCATION/TRAINING PROGRAM
Payer: COMMERCIAL

## 2025-07-14 ENCOUNTER — ANESTHESIA EVENT (OUTPATIENT)
Dept: ENDOSCOPY | Facility: HOSPITAL | Age: 50
End: 2025-07-14
Payer: COMMERCIAL

## 2025-07-14 ENCOUNTER — ANESTHESIA (OUTPATIENT)
Dept: ENDOSCOPY | Facility: HOSPITAL | Age: 50
End: 2025-07-14
Payer: COMMERCIAL

## 2025-07-14 ENCOUNTER — RESULTS FOLLOW-UP (OUTPATIENT)
Dept: ENDOSCOPY | Facility: HOSPITAL | Age: 50
End: 2025-07-14

## 2025-07-14 VITALS
SYSTOLIC BLOOD PRESSURE: 105 MMHG | HEIGHT: 62 IN | DIASTOLIC BLOOD PRESSURE: 72 MMHG | TEMPERATURE: 98 F | OXYGEN SATURATION: 100 % | BODY MASS INDEX: 52.08 KG/M2 | HEART RATE: 57 BPM | RESPIRATION RATE: 12 BRPM | WEIGHT: 283 LBS

## 2025-07-14 DIAGNOSIS — Z12.11 COLON CANCER SCREENING: ICD-10-CM

## 2025-07-14 DIAGNOSIS — K62.5 BRBPR (BRIGHT RED BLOOD PER RECTUM): ICD-10-CM

## 2025-07-14 LAB — B-HCG UR QL: NEGATIVE

## 2025-07-14 PROCEDURE — 45380 COLONOSCOPY AND BIOPSY: CPT | Performed by: STUDENT IN AN ORGANIZED HEALTH CARE EDUCATION/TRAINING PROGRAM

## 2025-07-14 RX ORDER — LIDOCAINE HYDROCHLORIDE 10 MG/ML
INJECTION, SOLUTION EPIDURAL; INFILTRATION; INTRACAUDAL; PERINEURAL AS NEEDED
Status: DISCONTINUED | OUTPATIENT
Start: 2025-07-14 | End: 2025-07-14 | Stop reason: SURG

## 2025-07-14 RX ORDER — SODIUM CHLORIDE, SODIUM LACTATE, POTASSIUM CHLORIDE, CALCIUM CHLORIDE 600; 310; 30; 20 MG/100ML; MG/100ML; MG/100ML; MG/100ML
INJECTION, SOLUTION INTRAVENOUS CONTINUOUS
Status: DISCONTINUED | OUTPATIENT
Start: 2025-07-14 | End: 2025-07-14

## 2025-07-14 RX ORDER — ONDANSETRON 2 MG/ML
INJECTION INTRAMUSCULAR; INTRAVENOUS AS NEEDED
Status: DISCONTINUED | OUTPATIENT
Start: 2025-07-14 | End: 2025-07-14 | Stop reason: SURG

## 2025-07-14 RX ORDER — NALOXONE HYDROCHLORIDE 0.4 MG/ML
0.08 INJECTION, SOLUTION INTRAMUSCULAR; INTRAVENOUS; SUBCUTANEOUS ONCE AS NEEDED
Status: DISCONTINUED | OUTPATIENT
Start: 2025-07-14 | End: 2025-07-14

## 2025-07-14 RX ORDER — GLYCOPYRROLATE 0.2 MG/ML
INJECTION, SOLUTION INTRAMUSCULAR; INTRAVENOUS AS NEEDED
Status: DISCONTINUED | OUTPATIENT
Start: 2025-07-14 | End: 2025-07-14 | Stop reason: SURG

## 2025-07-14 RX ORDER — METOCLOPRAMIDE HYDROCHLORIDE 5 MG/ML
INJECTION INTRAMUSCULAR; INTRAVENOUS AS NEEDED
Status: DISCONTINUED | OUTPATIENT
Start: 2025-07-14 | End: 2025-07-14 | Stop reason: SURG

## 2025-07-14 RX ADMIN — ONDANSETRON 4 MG: 2 INJECTION INTRAMUSCULAR; INTRAVENOUS at 09:09:00

## 2025-07-14 RX ADMIN — LIDOCAINE HYDROCHLORIDE 25 MG: 10 INJECTION, SOLUTION EPIDURAL; INFILTRATION; INTRACAUDAL; PERINEURAL at 09:09:00

## 2025-07-14 RX ADMIN — SODIUM CHLORIDE, SODIUM LACTATE, POTASSIUM CHLORIDE, CALCIUM CHLORIDE: 600; 310; 30; 20 INJECTION, SOLUTION INTRAVENOUS at 09:01:00

## 2025-07-14 RX ADMIN — SODIUM CHLORIDE, SODIUM LACTATE, POTASSIUM CHLORIDE, CALCIUM CHLORIDE: 600; 310; 30; 20 INJECTION, SOLUTION INTRAVENOUS at 09:26:00

## 2025-07-14 RX ADMIN — GLYCOPYRROLATE 0.2 MG: 0.2 INJECTION, SOLUTION INTRAMUSCULAR; INTRAVENOUS at 09:09:00

## 2025-07-14 RX ADMIN — METOCLOPRAMIDE HYDROCHLORIDE 10 MG: 5 INJECTION INTRAMUSCULAR; INTRAVENOUS at 09:09:00

## 2025-07-14 NOTE — TELEPHONE ENCOUNTER
GI Staff:    Can you please place recall for this patient to have a colonoscopy in 5 years.    Thank you.    Zane Lang MD     26 year old female presents to the ED with complaints of brown vaginal spotting since yesterday. Patient is well appearing. Based on LMP, patient is 6 weeks and 5 days pregnant. Will check labs and urine, perform ultrasound, and reassess.

## 2025-07-14 NOTE — H&P
History & Physical Examination    Patient Name: Francia Curran  MRN: [id number]  [facility]: [id number]  YOB: 1975    Diagnosis: screening for colon cancer, rectal bleeding.    Prescriptions Prior to Admission[1]  Current Hospital Medications[2]    Allergies: Allergies[3]    Past Medical History[4]  Past Surgical History[5]  Family History[6]  Social History     Tobacco Use    Smoking status: Never    Smokeless tobacco: Never   Substance Use Topics    Alcohol use: Not Currently       SYSTEM Check if Review is Normal Check if Physical Exam is Normal If not normal, please explain:   HEENT [X ] [ X]    NECK  [X ] [ X]    HEART [X ] [ X]    LUNGS [X ] [ X]    ABDOMEN [X ] [ X]    EXTREMITIES [X ] [ X]    OTHER        I have discussed the risks and benefits and alternatives of the procedure with the patient/family.  They understand and agree to proceed with plan of care.   I have reviewed the History and Physical done within the last 30 days.  Any changes noted above.    Zane Lang MD  [facility]                   [1]   Medications Prior to Admission   Medication Sig Dispense Refill Last Dose/Taking    MAGNESIUM OR Take by mouth daily.   Taking    naproxen 500 MG Oral Tab Take 1 tablet (500 mg total) by mouth 2 (two) times daily as needed. 30 tablet 0 Taking As Needed    PARoxetine Mesylate (BRISDELLE) 7.5 MG Oral Cap Take 1 tablet by mouth daily. 90 capsule 3 Taking    levothyroxine 25 MCG Oral Tab Take 1 tablet (25 mcg total) by mouth before breakfast. 30 tablet 2 Taking   [2]   Current Facility-Administered Medications   Medication Dose Route Frequency    lactated ringers infusion   Intravenous Continuous   [3] No Known Allergies  [4]   Past Medical History:   Disorder of thyroid    Hormone disorder    Hx of motion sickness    Osteoarthritis    PONV (postoperative nausea and vomiting)    Prediabetes    Visual impairment    glasses   [5]   Past Surgical  History:  Procedure Laterality Date         [6]   Family History  Problem Relation Age of Onset    No Known Problems Sister     No Known Problems Brother     Breast Cancer Neg     Ovarian Cancer Neg     Prostate Cancer Neg     Pancreatic Cancer Neg     Colon Cancer Neg

## 2025-07-14 NOTE — ANESTHESIA POSTPROCEDURE EVALUATION
Patient: Francia Curran    Procedure Summary       Date: 07/14/25 Room / Location: [facility] ENDOSCOPY 02 / [facility] ENDOSCOPY    Anesthesia Start: 0904 Anesthesia Stop: 0934    Procedure: COLONOSCOPY Diagnosis:       Colon cancer screening      BRBPR (bright red blood per rectum)      (Colon polyps, diverticulosis, hemorrhoids)    Surgeons: Zane Lang MD Anesthesiologist: Zohra Jin CRNA    Anesthesia Type: MAC ASA Status: 3            Anesthesia Type: MAC    Vitals Value Taken Time   /55 07/14/25 09:34   Temp 97.6 °F (36.4 °C) 07/14/25 09:34   Pulse 84 07/14/25 09:34   Resp 16 07/14/25 09:34   SpO2 97 % 07/14/25 09:34       [facility] AN Post Evaluation:   Patient Evaluated in PACU  Patient Participation: complete - patient participated  Level of Consciousness: awake and alert  Pain Score: 0  Airway Patency:patent  Yes    Nausea/Vomiting: none  Cardiovascular Status: acceptable, blood pressure returned to baseline and hemodynamically stable  Respiratory Status: acceptable  Postoperative Hydration acceptable      Zohra Jin CRNA  7/14/2025 9:35 AM

## 2025-07-14 NOTE — ANESTHESIA PREPROCEDURE EVALUATION
Anesthesia PreOp Note    HPI:     Francia Curran is a 50 year old female who presents for preoperative consultation requested by: Zane Lang MD    Date of Surgery: 7/14/2025    Procedure(s):  COLONOSCOPY  Indication: Colon cancer screening/ BRBPR (bright red blood per rectum)    Relevant Problems   No relevant active problems       NPO:  Last Liquid Consumption Date: 07/13/25  Last Liquid Consumption Time: 2100  Last Solid Consumption Date: 07/13/25  Last Solid Consumption Time: 1000  Last Liquid Consumption Date: 07/13/25          History Review:  Patient Active Problem List    Diagnosis Date Noted    Endocervical polyp 02/22/2018       Past Medical History[1]    Past Surgical History[2]    Prescriptions Prior to Admission[3]  Current Medications and Prescriptions Ordered in Epic[4]    Allergies[5]    Family History[6]  Social Hx on file[7]    Available pre-op labs reviewed.  Lab Results   Component Value Date    URINEPREG Negative 07/14/2025             Vital Signs:  Body mass index is 51.76 kg/m².   height is 1.575 m (5' 2\") and weight is 128.4 kg (283 lb). Her oral temperature is 98.2 °F (36.8 °C). Her blood pressure is 122/79 and her pulse is 60. Her respiration is 20 and oxygen saturation is 98%.   Vitals:    07/09/25 0923 07/14/25 0831   BP:  122/79   Pulse:  60   Resp:  20   Temp:  98.2 °F (36.8 °C)   TempSrc:  Oral   SpO2:  98%   Weight: 128.4 kg (283 lb)    Height: 1.575 m (5' 2\")         Anesthesia Evaluation     Patient summary reviewed and Nursing notes reviewed    History of anesthetic complications   Airway   Mallampati: II  TM distance: <3 FB  Neck ROM: full  Dental - Dentition appears grossly intact     Pulmonary - negative ROS and normal exam   Cardiovascular - negative ROS and normal exam    Rhythm: regular  Rate: normal    Neuro/Psych - negative ROS     GI/Hepatic/Renal - negative ROS     Endo/Other - negative ROS   Abdominal  - normal exam                 Anesthesia  Plan:   ASA:  3  Plan:   MAC      I have informed Francia Curran and/or legal guardian or family member of the nature of the anesthetic plan, benefits, risks including possible dental damage if relevant, major complications, and any alternative forms of anesthetic management.   All of the patient's questions were answered to the best of my ability. The patient desires the anesthetic management as planned.  Zohra Jin, CRNA  2025 8:57 AM  Present on Admission:  **None**           [1]   Past Medical History:   Disorder of thyroid    Hormone disorder    Hx of motion sickness    Osteoarthritis    PONV (postoperative nausea and vomiting)    Prediabetes    Visual impairment    glasses   [2]   Past Surgical History:  Procedure Laterality Date         [3]   Medications Prior to Admission   Medication Sig Dispense Refill Last Dose/Taking    MAGNESIUM OR Take by mouth daily.   Taking    naproxen 500 MG Oral Tab Take 1 tablet (500 mg total) by mouth 2 (two) times daily as needed. 30 tablet 0 Taking As Needed    PARoxetine Mesylate (BRISDELLE) 7.5 MG Oral Cap Take 1 tablet by mouth daily. 90 capsule 3 Taking    levothyroxine 25 MCG Oral Tab Take 1 tablet (25 mcg total) by mouth before breakfast. 30 tablet 2 2025   [4]   Current Facility-Administered Medications Ordered in Epic   Medication Dose Route Frequency Provider Last Rate Last Admin    lactated ringers infusion   Intravenous Continuous Zane Lang MD         No current [facility]-ordered outpatient medications on file.   [5] No Known Allergies  [6]   Family History  Problem Relation Age of Onset    No Known Problems Sister     No Known Problems Brother     Breast Cancer Neg     Ovarian Cancer Neg     Prostate Cancer Neg     Pancreatic Cancer Neg     Colon Cancer Neg    [7]   Social History  Socioeconomic History    Marital status:    Tobacco Use    Smoking status: Never    Smokeless tobacco: Never   Vaping Use     Vaping status: Never Used   Substance and Sexual Activity    Alcohol use: Not Currently    Drug use: Never    Sexual activity: Yes   Other Topics Concern    Blood Transfusions No

## 2025-07-14 NOTE — OPERATIVE REPORT
COLONOSCOPY REPORT    Francia Curran     3/7/1975 Age 50 year old   PCP Micky Blackburn MD Endoscopist Zane Lang MD       Date of procedure: 25    Procedure: Colonoscopy w/ cold biopsy forceps polypectomy     Pre-operative diagnosis: screening, rectal bleeding    Post-operative diagnosis: see impression    Medications: MAC    Withdrawal time: 16 minutes    Procedure:  Informed consent was obtained from the patient after the risks of the procedure were discussed, including but not limited to bleeding, perforation, aspiration, infection, or possibility of a missed lesion. After discussions of the risks/benefits and alternatives to this procedure, as well as the planned sedation, the patient was placed in the left lateral decubitus position and begun on continuous blood pressure pulse oximetry and EKG monitoring and this was maintained throughout the procedure. Once an adequate level of sedation was obtained a digital rectal exam was completed. Then the lubricated tip of the [id number] diagnostic video colonoscope was inserted and advanced without difficulty to the cecum using water immersion and CO2 insufflation technique. The cecum was identified by localizing the trifold, the appendix and the ileocecal valve. Withdrawal was begun with thorough washing and careful examination of the colonic walls and folds. A routine second examination of the cecum/ascending colon was performed. Photodocumentation was obtained. The bowel prep was fair. Views of the colon were good with washing. I then carefully withdrew the instrument from the patient who tolerated the procedure well.     Complications: none.    Findings:   1. 2 polyps noted as follows:      A. TWO -- 2 mm polyps in the cecum; sessile morphology; cold biopsy forceps polypectomy performed, polyps retrieved.    2. Diverticulosis: small left sided diverticula appreciated.    3. Ileocecal valve appeared healthy and  normal.    4. The colonic mucosa throughout the colon showed normal vascular pattern, without evidence of angioectasias or inflammation.     5. Rectum was meticulously evaluated in antegrade view and notable for small internal hemorrhoids.    6. JOJO: normal rectal tone, no masses palpated. External hemorrhoids noted.    Impression:   2 small polyps resected.  Diverticulosis.  Internal/external hemorrhoids. This is the only source of rectal bleeding identified on today's exam.  The colon was otherwise normal with glistening mucosa and intact vascular pattern.    Recommend:  Await pathology. The interval for the next colonoscopy will be determined after reviewing pathology. If new signs or symptoms develop, colonoscopy may need to be repeated sooner.   High fiber diet.  Monitor for blood in the stool. If having more than just tinge of blood, call office or go to the ER.  Use wet towelettes (Kleenix, Tucks, Hema) to clean the anal area after bowel movements and then pat dry the area with dry toilet paper.  DO NOT rub the area with dry toilet paper. Sitz baths can be taken as necessary (30 minutes soaking in a tub of tepid water once or twice a day for perianal burning and/or itching)  Can use anusol-HC suppositories twice daily for 14 days should symptoms worsen.  Start daily Metamucil and use one to two scoops per day.    >>>If tissue was obtained and you have not received your pathology results either by phone or letter within 2 weeks, please call our office at [phone].    Specimens: polyps    Blood loss: <1 ml

## 2025-07-14 NOTE — DISCHARGE INSTRUCTIONS
Home Care Instructions for Colonoscopy with Sedation    Diet:  - Resume your regular diet as tolerated unless otherwise instructed.  - Start with light meals to minimize bloating.  - Do not drink alcohol today.    Medication:  - If you have questions about resuming your normal medications, please contact your Primary Care Physician.    Activities:  - Take it easy today. Do not return to work today.  - Do not drive today.  - Do not operate any machinery today (including kitchen equipment).    Colonoscopy:  - You may notice some rectal \"spotting\" (a little blood on the toilet tissue) for a day or two after the exam. This is normal.  - If you experience any rectal bleeding (not spotting), persistent tenderness or sharp severe abdominal pains, oral temperature over 100 degrees Fahrenheit, light-headedness or dizziness, or any other problems, contact your doctor.      **If unable to reach your doctor, please go to the [facility] Emergency Room**    - Your referring physician will receive a full report of your examination.  - If you do not hear from your doctor's office within two weeks of your biopsy, please call them for your results.    You may be able to see your laboratory results in nooked between 4 and 7 business days.  In some cases, your physician may not have viewed the results before they are released to nooked.  If you have questions regarding your results contact the physician who ordered the test/exam by phone or via nooked by choosing \"Ask a Medical Question.\"

## 2025-07-15 NOTE — TELEPHONE ENCOUNTER
Recall colonoscopy in 5 years per Dr. Lang.     Last done 07/14/2025     Recall entered into patient outreach for  4/14/2030     Health maintenance updated.     Left Voicemail to contact department with any questions. Results in MyChart.   Routed to GI Clinical pool

## 2025-07-15 NOTE — TELEPHONE ENCOUNTER
----- Message from Zane Lang MD sent at 7/14/2025  3:49 PM CDT -----      ----- Message -----  From: Lab, Background User  Sent: 7/14/2025   1:46 PM CDT  To: Zane Lang MD

## 2025-07-29 ENCOUNTER — TELEPHONE (OUTPATIENT)
Dept: PHYSICAL THERAPY | Facility: HOSPITAL | Age: 50
End: 2025-07-29

## 2025-07-29 ENCOUNTER — OFFICE VISIT (OUTPATIENT)
Dept: PHYSICAL THERAPY | Facility: HOSPITAL | Age: 50
End: 2025-07-29
Attending: INTERNAL MEDICINE
Payer: COMMERCIAL

## 2025-07-29 DIAGNOSIS — M25.562 CHRONIC PAIN OF LEFT KNEE: Primary | ICD-10-CM

## 2025-07-29 DIAGNOSIS — G89.29 CHRONIC PAIN OF LEFT KNEE: Primary | ICD-10-CM

## 2025-07-29 PROCEDURE — 97162 PT EVAL MOD COMPLEX 30 MIN: CPT

## 2025-07-29 PROCEDURE — 97110 THERAPEUTIC EXERCISES: CPT

## 2025-08-05 ENCOUNTER — OFFICE VISIT (OUTPATIENT)
Dept: PHYSICAL THERAPY | Facility: HOSPITAL | Age: 50
End: 2025-08-05
Attending: INTERNAL MEDICINE

## 2025-08-05 PROCEDURE — 97530 THERAPEUTIC ACTIVITIES: CPT

## 2025-08-05 PROCEDURE — 97140 MANUAL THERAPY 1/> REGIONS: CPT

## 2025-08-05 PROCEDURE — 97112 NEUROMUSCULAR REEDUCATION: CPT

## 2025-08-12 ENCOUNTER — OFFICE VISIT (OUTPATIENT)
Dept: PHYSICAL THERAPY | Facility: HOSPITAL | Age: 50
End: 2025-08-12
Attending: INTERNAL MEDICINE

## 2025-08-12 PROCEDURE — 97110 THERAPEUTIC EXERCISES: CPT

## 2025-08-12 PROCEDURE — 97112 NEUROMUSCULAR REEDUCATION: CPT

## 2025-08-12 PROCEDURE — 97530 THERAPEUTIC ACTIVITIES: CPT

## 2025-08-19 ENCOUNTER — OFFICE VISIT (OUTPATIENT)
Dept: PHYSICAL THERAPY | Facility: HOSPITAL | Age: 50
End: 2025-08-19
Attending: INTERNAL MEDICINE

## 2025-08-19 ENCOUNTER — OFFICE VISIT (OUTPATIENT)
Dept: PHYSICAL MEDICINE AND REHAB | Facility: CLINIC | Age: 50
End: 2025-08-19

## 2025-08-19 VITALS — HEIGHT: 62 IN | BODY MASS INDEX: 52.08 KG/M2 | WEIGHT: 283 LBS

## 2025-08-19 DIAGNOSIS — M25.562 ACUTE PAIN OF LEFT KNEE: ICD-10-CM

## 2025-08-19 DIAGNOSIS — M22.2X2 PATELLOFEMORAL PAIN SYNDROME OF LEFT KNEE: ICD-10-CM

## 2025-08-19 DIAGNOSIS — S83.412A SPRAIN OF MEDIAL COLLATERAL LIGAMENT OF LEFT KNEE, INITIAL ENCOUNTER: Primary | ICD-10-CM

## 2025-08-19 PROCEDURE — 97110 THERAPEUTIC EXERCISES: CPT

## 2025-08-19 PROCEDURE — 99204 OFFICE O/P NEW MOD 45 MIN: CPT | Performed by: PHYSICAL MEDICINE & REHABILITATION

## 2025-08-19 PROCEDURE — 97112 NEUROMUSCULAR REEDUCATION: CPT

## 2025-08-19 PROCEDURE — 97530 THERAPEUTIC ACTIVITIES: CPT

## 2025-08-19 RX ORDER — NAPROXEN 500 MG/1
500 TABLET ORAL 2 TIMES DAILY WITH MEALS
Qty: 60 TABLET | Refills: 0 | Status: SHIPPED | OUTPATIENT
Start: 2025-08-19

## 2025-08-26 ENCOUNTER — APPOINTMENT (OUTPATIENT)
Dept: PHYSICAL THERAPY | Facility: HOSPITAL | Age: 50
End: 2025-08-26
Attending: INTERNAL MEDICINE

## (undated) DEVICE — GIJAW SINGLE-USE BIOPSY FORCEPS WITH NEEDLE: Brand: GIJAW

## (undated) DEVICE — MEDI-VAC NON-CONDUCTIVE SUCTION TUBING 6MM X 1.8M (6FT.) L: Brand: CARDINAL HEALTH

## (undated) DEVICE — V2 SPECIMEN COLLECTION MANIFOLD KIT: Brand: NEPTUNE

## (undated) DEVICE — KIT ENDO ORCAPOD 160/180/190

## (undated) DEVICE — Device

## (undated) DEVICE — 60 ML SYRINGE REGULAR TIP: Brand: MONOJECT

## (undated) DEVICE — KIT CLEAN ENDOKIT 1.1OZ GOWNX2

## (undated) NOTE — LETTER
[facility]   Administracion de Anestesia  Yo, Mary Miguel Greeneaneda, acepto ser atendido por un anestesiólogo, quien está especialmente capacitado para monitorearme y darme medicamento para hacerme dormir o mantenerme cómodo ammy mi procedimiento.   Entiendo que mi anestesiólogo no es un empleado o agente de [facility]. Él o jo trabaja para [facility]  Briana el paciente que solicita los servicios de anestesia, estoy de acuerdo con lo siguiente:  Permitir al anestesiólogo (médico de anestesia) que me suministre el medicamento y hacer los procedimientos adicionales que berta necesarios. Algunos ejemplos son: Al iniciar o utilizar juana “IV” para suministrarme medicamentos, fluidos o lisandro ammy mi procedimiento, y colocarme juana sonda de respiración, me ayudará a respirar cuando esté dormido (intubación). En el abby de que mi corazón deje de funcionar correctamente, entiendo que mi anestesiólogo hará todo lo posible para salvar mi jeanie, a menos que los documentos de No resucitar de [facility] lo indiquen de otra manera.  Informar a mi anestesista antes del procedimiento:  Si estoy embarazada.  La última vez que comí o bebí algo.  Todos los medicamentos que marilyn (incluyendo medicamentos recetados, suplementos herbales y pastillas que puedo comprar sin receta médica (incluyendo drogas en la palmer [medicamentos ilegales]). No informar a mi anestesiólogo acerca de estos medicamentos puede aumentar mi riesgo de complicaciones con la anestesia.  Si soy alérgico a cualquier cosa o he tenido previamente juana reacción adversa a la anestesia.  Entiendo cómo la anestesia me ayudará (beneficios).  Entiendo que con cualquier tipo de anestesia hay riesgos:  Los riesgos más comunes son: náuseas, vómitos, dolor de garganta, dolor muscular, daño a los ojos, la boca o los dientes (por la colocación de la sonda de respiración).  Los riesgos poco comunes  incluyen: recordar lo que sucedió ammy mi procedimiento, reacciones alérgicas a los medicamentos, lesiones en las vías respiratorias, el corazón, los pulmones, la visión, los nervios o músculos, y en casos sumamente raros, la muerte.  Mii médico me ha explicado otras opciones de atención disponibles para mí (alternativas).  Pacientes embarazadas (“epidural”):    Entiendo que los riesgos de recibir juana inyección epidural (medicamento que se aplica en la espalda para ayudar a controlar el dolor ammy el parto), incluyen picazón, presión arterial baja, dificultad para orinar, dolor de lia o disminución en el ritmo del corazón del bebé. Los riesgos muy poco comunes incluyen infección, hemorragia, convulsiones, ritmo cardíaco irregular y lesión del nervio.  Anestesia regional (“columna vertebral”, “epidural” y “bloqueo de los nervios”):  Entiendo que hay complicaciones poco frecuentes cruz posibles, e incluyen dolor de lia, sangrado, infección, convulsiones, ritmo cardíaco irregular y la lesión del nervio.  .   Puedo cambiar de opinión acerca de recibir servicios de anestesia en cualquier momento antes de que se me administre el medicamento.         Paciente (o representante) Firma/Relación con el paciente  Fecha  Hora  Patient Signature/Signature of Responsible person Date Time           Nombre del intérprete (en albarran abby)  Idioma/Organización  Hora  Name of  Language/Organization Time          Firma del anestesiólogo Fecha  Hora  Signature of anesthesiologist Date Time    He hablado del procedimiento y la información anterior con el paciente (o el representante del paciente) y respondí yessy preguntas. El paciente o albarran representante ha aceptado recibir los servicios de anestesia.         Testigo Fecha  Hora  Witness Date Time  He verificado que la firma es la del paciente o del representante del paciente, y que se firmó antes del procedimiento.    Nombre del paciente: Francia Curran   Fec. de Nac.: 3/7/1975                 En letra de imprenta: July 9, 2025  Expediente médico No. [id number]                         Página 1 de 2  ----------ANESTHESIA CONSENT----------

## (undated) NOTE — LETTER
Date & Time: 12/11/2023, 7:35 PM  Patient: Pippa Linder  Encounter Provider(s):    PRASHANTH Blakely       To Whom It May Concern:    Pippa Linder was seen and treated in our department on 12/11/2023. Please excuse from work for the rest of the week.     If you have any questions or concerns, please do not hesitate to call.        _____________________________  Physician/APC Signature

## (undated) NOTE — LETTER
04/05/22      To Whom it may concern:      Fili Díaz is under my care and was in clinic today for an evaluation. If you have any questions please do not hesitate to contact me.           Tracye De La Rosa MD

## (undated) NOTE — LETTER
Date & Time: 3/30/2023, 4:27 PM  Patient: Lidia Lopez  Encounter Provider(s):    Abena Fraser PA-C       To Whom It May Concern:    Aloma Boeck was seen and treated in our department on 3/30/2023. She can return to work on 4/3/23.     If you have any questions or concerns, please do not hesitate to call.        _____________________________  Physician/APC Signature

## (undated) NOTE — LETTER
Patient Name: Francia Curran : 3/7/1975  Medical Record #: [id number] Printed: 2025  Page 1 of 2                                          [facility]  [address], [place], [place]  Autorización para operación y procedimiento quirúrgico                                                                                                      Por la presente, autorizo a Zane Lang MD, mi médico y al asistente a realizar la operación/procedimiento quirúrgico a continuación, así toma a administrar la anestesia que determine necesaria mi médico Nombre (s) de la operación/procedimiento: COLONOSCOPY en Francia Curran     Reconozco que ammy la operación/procedimiento quirúrgico, las condiciones imprevistas pueden requerir de procedimientos adicionales o diferentes a aquellos mencionados anteriormente.  Por lo tanto, autorizo y solicito además que el cirujano antes mencionado, los asistentes o las personas designadas realicen los procedimientos que, a albarran juicio, berta necesarios y convenientes.    Mi cirujano/médico mcgraw discutido antes de mi cirugía los posibles beneficios, riesgos y efectos secundarios de ella procedimiento, la probabilidad de alcanzar las metas y los posibles problemas que puedan ocurrir ammy la recuperación.  Ellos también bradley discutido las alternativas razonables al procedimiento, incluso los riesgos, beneficios y efectos secundarios relacionados con las alternativas y los riesgos relacionados con no realizar ella procedimiento.  Bradley respondido a todas mis preguntas y confirmo que no se ha dado ninguna garantía en cuanto a los resultados que pueda obtener.    En abby de que surja la necesidad ammy mi operación o ammy el periodo postoperatorio, también autorizo se aplique lisandro y/o productos sanguíneos.  Asimismo, entiendo que a pesar de las cuidadosas pruebas y análisis de lisandro o de los productos sanguíneos que realizan las  entidades recolectoras, todavía puedo estar sujeto a efectos adversos toma resultado de recibir juana transfusión de lisandro y/o productos sanguíneos.  A continuación se mencionan algunos, aunque no todos, los riesgos potenciales que pueden ocurrir: fiebre y reacciones alérgicas, reacciones hemolíticas, trasmisión de enfermedades toma hepatitis, SIDA y citomegalovirus (CMV), así toma sobrecarga de líquidos.   En abby de que desee tener juana transfusión autóloga de mi propia lisandro o juana transfusión de un donante dirigido.  Lo discutiré con mi médico.  Check only if Refusing Blood or Blood Products  I understand refusal of blood or blood products as deemed necessary by my physician may have serious consequences to my condition to include possible death. I hereby assume responsibility for my refusal and release the hospital, its personnel, and my physicians from any responsibility for the consequences of my refusal.           o  Refuse       Autorizo el uso de cualquier muestra, órgano, tejido, parte del cuerpo u objeto extraño que pueda ser extraído de mi cuerpo ammy la operación/procedimiento para fines de diagnóstico, investigación o de enseñanza y albarran desecho posterior por las autoridades del hospital.  También, autorizo la revelación de los resultados de las pruebas de muestras y/o los informes escritos al médico tratante toma personal médico del hospital u otros médicos de referencia o consulta involucrados en mi atención, a discreción del patólogo o de mi médico tratante.    Doy consentimiento para que se fotografíen o graben vídeos de las operaciones o procedimientos a realizarse, incluidas las partes de mi cuerpo que berta adecuadas para propósitos médicos, científicos o educativos, en el entendido de que, mi identidad no sea revelada por las fotografías o por textos descriptivos que las acompañen.  Si el procedimiento es fotografiado o grabado en vídeo, el cirujano obtendrá la imagen, cinta de vídeo o CD  original.  El hospital no se hará responsable por el almacenamiento, la revelación o el mantenimiento de la imagen, cinta o CD.    Autorizo la presencia de un especialista de producto u observadores en el quirófano, según lo considere necesario mi médico o las personas que éste designe.     Reconozco que en abby de que mi procedimiento resulte en un tiempo prolongado de radiografía/fluoroscopia, puedo desarrollar juana reacción en la piel.    Si tengo juana orden de No intentar la reanimación (SHANTELL), vic estado se suspenderá mientras esté en el quirófano, la abdelrahman de procedimientos y ammy el periodo de recuperación a menos que yo indique lo contrario explícitamente (o juana persona autorizada a mallorie el consentimiento en mi nombre). El cirujano o mi médico tratante determinarán cuándo termina el periodo de recuperación aplicable a los efectos de restablecer la orden de SHANTELL.  Pacientes que se realizan un procedimiento de esterilización: Entiendo que si el procedimiento tiene éxito, los resultados serán permanentes y que, por lo tanto, me será imposible inseminar, concebir o tener hijos.  Asimismo, entiendo que el procedimiento tiene toma propósito la esterilidad, aunque el resultado no está garantizado.   Admito que mi médico me ha explicado la aplicación de sedación/analgesia, incluidos los riesgos y beneficios y, consiento a la administración de sedación/analgesia conforme sea necesario o conveniente a juicio de mi médico.      CERTIFICO QUE HE LEÍDO Y COMPRENDIDO EL CONSENTIMIENTO ANTERIOR PARA LA OPERACIÓN y/o PROCEDIMIENTO.             ______________________________________  _______________________________  Firma del paciente      Firma de la persona responsible  Signature of patient      Signature of responsible person                        ___________________________________                                   Nombre en imprenta de la persona responsible         Name of responsible  person          ___________________________________                 Relación con el paciente         Relationship to patient    _________________________________________  ______________ ________________  Firma del testigo          Fecha / Date Hora / Time  Signature of witness    DECLARACÓN DEL MÉDICO Mediante mi firma al calce, afirmo que antes de la hora del procedimiento, he explicadoal paciente y/o a albarran representante legal, los riesgos y beneficios involucrados en el tratamiento propuesto, así comocualquier alternativa razonable al tratamiento propuesto. También le(s) he explicado los riesgos y beneficios involucradosen el rechazo del tratarniento propuesto y alternativas al tratamiento propuesto, y he respondido a las preguntas del(la) paciente(My signature below affirms that prior to the time of the procedure, I have explained to the patient and/or his/her guardian, therisks and benefits involved in the proposed treatment and any reasonable alternative to the proposed treatment. I have alsoexplained the risks and benefits involved in refusal of the proposed treatment and have answered the patient's questions.)    ________________________________________   _________________________   _____________   (Firma del médico/Signature of Physician)                                    (Fecha/Date)                                             (Hora/Time)      Patient Name: Francia Rosariozquez Magdy     : 3/7/1975                 Printed: 2025      Medical Record #: [id number]                                              Page 2 of 2

## (undated) NOTE — LETTER
07/15/21    To Whom it May Concern:      Iraj Saima was seen in my office today. If you have any questions please do not hesitate to contact my office.               Sincerely,          Tian Gomez MD